# Patient Record
Sex: FEMALE | Race: BLACK OR AFRICAN AMERICAN | NOT HISPANIC OR LATINO | URBAN - NONMETROPOLITAN AREA
[De-identification: names, ages, dates, MRNs, and addresses within clinical notes are randomized per-mention and may not be internally consistent; named-entity substitution may affect disease eponyms.]

---

## 2019-01-23 ENCOUNTER — IMPORTED ENCOUNTER (OUTPATIENT)
Dept: URBAN - NONMETROPOLITAN AREA CLINIC 1 | Facility: CLINIC | Age: 79
End: 2019-01-23

## 2019-01-23 PROCEDURE — 92250 FUNDUS PHOTOGRAPHY W/I&R: CPT

## 2019-01-23 PROCEDURE — 92012 INTRM OPH EXAM EST PATIENT: CPT

## 2019-05-22 ENCOUNTER — IMPORTED ENCOUNTER (OUTPATIENT)
Dept: URBAN - NONMETROPOLITAN AREA CLINIC 1 | Facility: CLINIC | Age: 79
End: 2019-05-22

## 2019-05-22 PROCEDURE — 92012 INTRM OPH EXAM EST PATIENT: CPT

## 2019-05-22 PROCEDURE — 92133 CPTRZD OPH DX IMG PST SGM ON: CPT

## 2019-05-22 NOTE — PATIENT DISCUSSION
*PRIMARY OPEN ANGLE GLAUCOMA:. MONITOR-  Discussed findings of exam in detail with the patient. -  discussed the chronic progressive nature of this disease and various treatment options. -  importance of good compliance with medications was emphasized. -  gonioscopy performed today. -  prescription eye drops were continued. CONT XAL QHS OU DUE TO INSURANCEoct today/stable ou*CATARACT OBSERVE-	  We discussed the patients cataract(s) in detail with the patient.-	  The patient/ physician elected to wait on surgery at this time. -	  The patient was informed of the symptoms related to cataract progressionmild progressionmonitor

## 2019-09-25 ENCOUNTER — IMPORTED ENCOUNTER (OUTPATIENT)
Dept: URBAN - NONMETROPOLITAN AREA CLINIC 1 | Facility: CLINIC | Age: 79
End: 2019-09-25

## 2019-09-25 PROCEDURE — 92012 INTRM OPH EXAM EST PATIENT: CPT

## 2019-09-25 NOTE — PATIENT DISCUSSION
*PRIMARY OPEN ANGLE GLAUCOMA:. MONITOR-  Discussed findings of exam in detail with the patient. -  discussed the chronic progressive nature of this disease and various treatment options. -  importance of good compliance with medications was emphasized. -  gonioscopy performed today. -  prescription eye drops were continued. CONT XAL QHS OU DUE TO INSURANCE6 MON TA CHECK AND VF*CATARACT OBSERVE-	  We discussed the patients cataract(s) in detail with the patient.-	  The patient/ physician elected to wait on surgery at this time. -	  The patient was informed of the symptoms related to cataract progressionmild progressionmonitor

## 2020-07-29 ENCOUNTER — IMPORTED ENCOUNTER (OUTPATIENT)
Dept: URBAN - NONMETROPOLITAN AREA CLINIC 1 | Facility: CLINIC | Age: 80
End: 2020-07-29

## 2020-07-29 PROCEDURE — 92012 INTRM OPH EXAM EST PATIENT: CPT

## 2021-07-06 ENCOUNTER — IMPORTED ENCOUNTER (OUTPATIENT)
Dept: URBAN - NONMETROPOLITAN AREA CLINIC 1 | Facility: CLINIC | Age: 81
End: 2021-07-06

## 2021-07-06 PROCEDURE — 92014 COMPRE OPH EXAM EST PT 1/>: CPT

## 2021-07-06 NOTE — PATIENT DISCUSSION
*PRIMARY OPEN ANGLE GLAUCOMA:. -MONITOR-CONT XAL QHS OU-cont timolol 0.5% qam ou-stable iop today-monitor for iop and vf changes

## 2022-01-11 ENCOUNTER — IMPORTED ENCOUNTER (OUTPATIENT)
Dept: URBAN - NONMETROPOLITAN AREA CLINIC 1 | Facility: CLINIC | Age: 82
End: 2022-01-11

## 2022-01-11 PROCEDURE — 99213 OFFICE O/P EST LOW 20 MIN: CPT

## 2022-01-11 PROCEDURE — 92083 EXTENDED VISUAL FIELD XM: CPT

## 2022-01-11 NOTE — PATIENT DISCUSSION
*PRIMARY OPEN ANGLE GLAUCOMA:. -MONITOR-CONT XAL QHS OU-cont timolol 0.5% qam ou-stable iop today-vf today stable ou-monitor for iop and vf changesCataract OU-Not yet surgical. -Reviewed symptoms of advancing cataract growth such as glare and halos and decreased vision.-Continue to monitor for now. Pt will notify us if any new symptoms develop.

## 2022-04-09 ASSESSMENT — VISUAL ACUITY
OS_SC: 20/20
OS_SC: 20/25-2
OD_SC: 20/25
OD_CC: J1
OS_SC: 20/25
OD_SC: 20/25
OD_SC: 20/30-1
OD_SC: 20/30-1
OS_SC: 20/25
OS_SC: 20/25-2
OU_SC: 20/25
OS_CC: J1
OD_SC: 20/30
OD_SC: 20/25
OS_SC: 20/25

## 2022-04-09 ASSESSMENT — TONOMETRY
OS_IOP_MMHG: 15
OS_IOP_MMHG: 18
OD_IOP_MMHG: 16
OD_IOP_MMHG: 16
OD_IOP_MMHG: 15
OS_IOP_MMHG: 18
OS_IOP_MMHG: 15
OD_IOP_MMHG: 17
OS_IOP_MMHG: 15
OS_IOP_MMHG: 16
OD_IOP_MMHG: 15
OD_IOP_MMHG: 16

## 2022-07-12 ENCOUNTER — ESTABLISHED PATIENT (OUTPATIENT)
Dept: RURAL CLINIC 1 | Facility: CLINIC | Age: 82
End: 2022-07-12

## 2022-07-12 DIAGNOSIS — H40.1131: ICD-10-CM

## 2022-07-12 PROCEDURE — 92014 COMPRE OPH EXAM EST PT 1/>: CPT

## 2022-07-12 PROCEDURE — 92133 CPTRZD OPH DX IMG PST SGM ON: CPT

## 2022-07-12 ASSESSMENT — VISUAL ACUITY
OS_CC: 20/40
OD_CC: 20/40
OU_CC: 20/40

## 2022-07-12 ASSESSMENT — TONOMETRY
OS_IOP_MMHG: 17
OD_IOP_MMHG: 17

## 2022-12-08 NOTE — PATIENT DISCUSSION
*PRIMARY OPEN ANGLE GLAUCOMA:. MONITOR-  Discussed findings of exam in detail with the patient. -  discussed the chronic progressive nature of this disease and various treatment options. -  importance of good compliance with medications was emphasized. -  gonioscopy performed today. -  prescription eye drops were continued. CONT XAL QHS OU DUE TO INSURANCEphotos today*CATARACT OBSERVE-	  We discussed the patients cataract(s) in detail with the patient.-	  The patient/ physician elected to wait on surgery at this time. -	  The patient was informed of the symptoms related to cataract progressionmild progressionmonitor
done

## 2023-05-23 ENCOUNTER — COMPREHENSIVE EXAM (OUTPATIENT)
Dept: RURAL CLINIC 1 | Facility: CLINIC | Age: 83
End: 2023-05-23

## 2023-05-23 DIAGNOSIS — H25.13: ICD-10-CM

## 2023-05-23 DIAGNOSIS — H40.1131: ICD-10-CM

## 2023-05-23 PROCEDURE — 92014 COMPRE OPH EXAM EST PT 1/>: CPT

## 2023-05-23 ASSESSMENT — TONOMETRY
OS_IOP_MMHG: 17
OD_IOP_MMHG: 18

## 2023-05-23 ASSESSMENT — VISUAL ACUITY
OU_CC: J1+
OU_CC: 20/20
OD_CC: 20/40
OS_CC: 20/25

## 2023-11-14 ENCOUNTER — FOLLOW UP (OUTPATIENT)
Dept: RURAL CLINIC 1 | Facility: CLINIC | Age: 83
End: 2023-11-14

## 2023-11-14 DIAGNOSIS — H40.1131: ICD-10-CM

## 2023-11-14 DIAGNOSIS — H25.13: ICD-10-CM

## 2023-11-14 PROCEDURE — 99214 OFFICE O/P EST MOD 30 MIN: CPT

## 2023-11-14 PROCEDURE — 92083 EXTENDED VISUAL FIELD XM: CPT

## 2023-11-14 ASSESSMENT — TONOMETRY
OD_IOP_MMHG: 19
OS_IOP_MMHG: 19

## 2023-11-14 ASSESSMENT — VISUAL ACUITY
OD_CC: 20/30
OS_CC: 20/25

## 2023-12-11 ENCOUNTER — CONSULTATION/EVALUATION (OUTPATIENT)
Dept: RURAL CLINIC 1 | Facility: CLINIC | Age: 83
End: 2023-12-11

## 2023-12-11 DIAGNOSIS — H25.13: ICD-10-CM

## 2023-12-11 DIAGNOSIS — H40.1131: ICD-10-CM

## 2023-12-11 PROCEDURE — 92136 OPHTHALMIC BIOMETRY: CPT

## 2023-12-11 PROCEDURE — 92025 CPTRIZED CORNEAL TOPOGRAPHY: CPT

## 2023-12-11 PROCEDURE — 99214 OFFICE O/P EST MOD 30 MIN: CPT

## 2023-12-11 ASSESSMENT — VISUAL ACUITY
OS_AM: 20/25
OD_BAT: 20/50
OU_CC: 20/30+2
OD_CC: 20/20
OU_SC: 20/40+2
OS_BAT: 20/50
OD_PAM: 20/25
OD_CC: 20/30
OU_CC: 20/20
OD_PH: 20/30-2
OS_CC: 20/20
OS_SC: 20/40-1
OS_CC: 20/25
OD_SC: 20/40
OS_PH: 20/30-1

## 2023-12-11 ASSESSMENT — TONOMETRY
OS_IOP_MMHG: 18
OD_IOP_MMHG: 18

## 2023-12-11 ASSESSMENT — KERATOMETRY
OS_AXISANGLE2_DEGREES: 180
OD_K1POWER_DIOPTERS: 41.50
OD_AXISANGLE_DEGREES: 90
OD_K2POWER_DIOPTERS: 41.25
OS_K2POWER_DIOPTERS: 42.50
OD_AXISANGLE2_DEGREES: 180
OS_AXISANGLE_DEGREES: 90
OS_K1POWER_DIOPTERS: 41.0

## 2024-08-21 ENCOUNTER — COMPREHENSIVE EXAM (OUTPATIENT)
Dept: RURAL CLINIC 1 | Facility: CLINIC | Age: 84
End: 2024-08-21

## 2024-08-21 DIAGNOSIS — H40.1131: ICD-10-CM

## 2024-08-21 DIAGNOSIS — H25.13: ICD-10-CM

## 2024-08-21 PROCEDURE — 92014 COMPRE OPH EXAM EST PT 1/>: CPT

## 2024-08-21 ASSESSMENT — KERATOMETRY
OD_K1POWER_DIOPTERS: 41.50
OS_K1POWER_DIOPTERS: 41.0
OD_K2POWER_DIOPTERS: 41.25
OD_AXISANGLE_DEGREES: 90
OS_AXISANGLE_DEGREES: 90
OS_AXISANGLE2_DEGREES: 180
OS_K2POWER_DIOPTERS: 42.50
OD_AXISANGLE2_DEGREES: 180

## 2024-08-21 ASSESSMENT — VISUAL ACUITY
OU_CC: 20/20-1
OD_CC: 20/40
OS_CC: 20/25
OS_CC: 20/25
OU_CC: 20/25
OD_CC: 20/25

## 2024-08-21 ASSESSMENT — TONOMETRY
OD_IOP_MMHG: 18
OS_IOP_MMHG: 18

## 2025-04-07 ENCOUNTER — FOLLOW UP (OUTPATIENT)
Age: 85
End: 2025-04-07

## 2025-04-07 DIAGNOSIS — H40.1131: ICD-10-CM

## 2025-04-07 DIAGNOSIS — H25.13: ICD-10-CM

## 2025-04-07 PROCEDURE — 99214 OFFICE O/P EST MOD 30 MIN: CPT

## 2025-04-07 PROCEDURE — 92083 EXTENDED VISUAL FIELD XM: CPT
